# Patient Record
Sex: FEMALE | Race: WHITE | HISPANIC OR LATINO | Employment: FULL TIME | URBAN - METROPOLITAN AREA
[De-identification: names, ages, dates, MRNs, and addresses within clinical notes are randomized per-mention and may not be internally consistent; named-entity substitution may affect disease eponyms.]

---
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
## 2022-01-17 ENCOUNTER — OFFICE VISIT (OUTPATIENT)
Dept: URGENT CARE | Facility: CLINIC | Age: 22
End: 2022-01-17
Payer: COMMERCIAL

## 2022-01-17 VITALS
BODY MASS INDEX: 18.19 KG/M2 | HEART RATE: 71 BPM | TEMPERATURE: 98 F | RESPIRATION RATE: 18 BRPM | OXYGEN SATURATION: 98 % | SYSTOLIC BLOOD PRESSURE: 104 MMHG | HEIGHT: 68 IN | DIASTOLIC BLOOD PRESSURE: 69 MMHG | WEIGHT: 120 LBS

## 2022-01-17 DIAGNOSIS — S06.0X0A CONCUSSION WITHOUT LOSS OF CONSCIOUSNESS, INITIAL ENCOUNTER: Primary | ICD-10-CM

## 2022-01-17 DIAGNOSIS — S16.1XXA STRAIN OF NECK MUSCLE, INITIAL ENCOUNTER: ICD-10-CM

## 2022-01-17 PROCEDURE — 99203 PR OFFICE/OUTPT VISIT, NEW, LEVL III, 30-44 MIN: ICD-10-PCS | Mod: S$GLB,,, | Performed by: NURSE PRACTITIONER

## 2022-01-17 PROCEDURE — 1160F PR REVIEW ALL MEDS BY PRESCRIBER/CLIN PHARMACIST DOCUMENTED: ICD-10-PCS | Mod: CPTII,S$GLB,, | Performed by: NURSE PRACTITIONER

## 2022-01-17 PROCEDURE — 1159F PR MEDICATION LIST DOCUMENTED IN MEDICAL RECORD: ICD-10-PCS | Mod: CPTII,S$GLB,, | Performed by: NURSE PRACTITIONER

## 2022-01-17 PROCEDURE — 3008F BODY MASS INDEX DOCD: CPT | Mod: CPTII,S$GLB,, | Performed by: NURSE PRACTITIONER

## 2022-01-17 PROCEDURE — 3008F PR BODY MASS INDEX (BMI) DOCUMENTED: ICD-10-PCS | Mod: CPTII,S$GLB,, | Performed by: NURSE PRACTITIONER

## 2022-01-17 PROCEDURE — 1159F MED LIST DOCD IN RCRD: CPT | Mod: CPTII,S$GLB,, | Performed by: NURSE PRACTITIONER

## 2022-01-17 PROCEDURE — 99203 OFFICE O/P NEW LOW 30 MIN: CPT | Mod: S$GLB,,, | Performed by: NURSE PRACTITIONER

## 2022-01-17 PROCEDURE — 3074F SYST BP LT 130 MM HG: CPT | Mod: CPTII,S$GLB,, | Performed by: NURSE PRACTITIONER

## 2022-01-17 PROCEDURE — 1160F RVW MEDS BY RX/DR IN RCRD: CPT | Mod: CPTII,S$GLB,, | Performed by: NURSE PRACTITIONER

## 2022-01-17 PROCEDURE — 3078F DIAST BP <80 MM HG: CPT | Mod: CPTII,S$GLB,, | Performed by: NURSE PRACTITIONER

## 2022-01-17 PROCEDURE — 3078F PR MOST RECENT DIASTOLIC BLOOD PRESSURE < 80 MM HG: ICD-10-PCS | Mod: CPTII,S$GLB,, | Performed by: NURSE PRACTITIONER

## 2022-01-17 PROCEDURE — 3074F PR MOST RECENT SYSTOLIC BLOOD PRESSURE < 130 MM HG: ICD-10-PCS | Mod: CPTII,S$GLB,, | Performed by: NURSE PRACTITIONER

## 2022-01-17 NOTE — PATIENT INSTRUCTIONS
Patient Education       Cervical Muscle Strain Discharge Instructions   About this topic   A cervical muscle strain happens when the muscles in your neck are is stretched too much. This is the medical name for a whiplash injury that happens when your head is suddenly jerked forward and backward. A cervical strain most often happens from a car crash or sports injury.  Your neck has many parts including bones, muscles, tendons, ligaments, and nerves. Vertebrae, the bones in your spine, start at the base of your skull and extend down the back of your neck. There are discs between the vertebrae to cushion the bones. Ligaments, muscles, and tendons help hold your spine in place and let you move your neck. Your spinal cord, the major nerve of your body, starts at the base of your brain and extends down your back. It is protected by your vertebrae. Smaller nerves travel from your spinal cord to your muscles and skin. Most neck pain is caused by an injury to a ligament, tendon, muscle, or nerve.     What care is needed at home?   · Ask your doctor what you need to do when you go home. Make sure you ask questions if you do not understand what the doctor says. This way you will know what you need to do.  · Wear your neck brace or cushion as you were told to. If the doctor told you to, you may start doing gentle neck stretches in a few days.  · For recent sprains, place an ice pack or a bag of frozen vegetables wrapped in a towel over the painful part. Never put ice right on the skin. Use ice every 1 to 2 hours for 10 to 15 minutes at a time. Use for the first 24 to 48 hours after your injury.  · Use heat after the first 24 to 48 hours, but not right away. Put a heating pad on the painful part for no more than 20 minutes at a time. Never go to sleep with a heating pad on as this can cause burns. You can also take a hot shower or bath.  · You may want to take medicines like ibuprofen or naproxen for swelling and pain. These are  nonsteroidal anti-inflammatory drugs (NSAIDs).  · Try to practice good posture to avoid putting strain on your neck. Sit up straight and keep your shoulders back. It can also help to avoid sitting in the same position for too long and to avoid putting pressure on your upper back by carrying heavy things. When you sleep, try to keep your neck in line with the rest of your body.  What follow-up care is needed?   Your doctor may ask you to make visits to the office to check on your progress. Be sure to keep these visits. Your doctor may send you to physical therapy or a chiropractor to help you heal faster.  What drugs may be needed?   The doctor may order drugs to:  · Help with pain and swelling  · Relax muscles  What can be done to prevent this health problem?   · Always wear a seat belt. Drive safely. Obey speed limits. Do not drink and drive. Do not text and drive.  · Have headrests in the car at the right height. The middle of the headrest should be even with the upper parts of your ears.  · Use good posture. Do not slouch.  · Take breaks often when doing things that use repeat movements.  · Warm up slowly and stretch before you work out. Use good ways to train, such as slowly adding to how far you run. Do not work out if you are overly tired. Take extra care if working out in cold weather.  · Keep a healthy weight so there is not extra stress on your joints. Eat a healthy diet to keep your muscles healthy.  · Stay active and work out to keep your muscles strong and flexible.  · Use the proper pillow.  When do I need to call the doctor?   · You have trouble breathing.  · You are too weak to stand or cannot move one or both of your arms or legs.  · You have new weakness in one or both of your arms or legs.  · You have numbness, tingling, or shooting pain in one or both of your arms or legs.  · You have bad pain that is not helped by pain medicine.  · Your symptoms are not getting better after treating them at home  for a few weeks.  · Your symptoms are getting worse and you cannot do normal activities like dress yourself or eat.  Teach Back: Helping You Understand   The Teach Back Method helps you understand the information we are giving you. After you talk with the staff, tell them in your own words what you learned. This helps to make sure the staff has described each thing clearly. It also helps to explain things that may have been confusing. Before going home, make sure you can do these:  · I can tell you about my condition.  · I can tell you what may help ease my pain.  · I can tell you what I will do if I have more pain or swelling.  Where can I learn more?   [employer]  http://orthoinfo.aaos.org/topic.cfm?boxjh=L46624   [employer] [facility]  https://www.betterhealth.[place].gov.[place]/health/ConditionsAndTreatments/neck-pain   Last Reviewed Date   2021-06-10  Consumer Information Use and Disclaimer   This information is not specific medical advice and does not replace information you receive from your health care provider. This is only a brief summary of general information. It does NOT include all information about conditions, illnesses, injuries, tests, procedures, treatments, therapies, discharge instructions or life-style choices that may apply to you. You must talk with your health care provider for complete information about your health and treatment options. This information should not be used to decide whether or not to accept your health care providers advice, instructions or recommendations. Only your health care provider has the knowledge and training to provide advice that is right for you.  Copyright   Copyright © 2021 UpToDate, Inc. and its affiliates and/or licensors. All rights reserved.  Patient Education       Concussion in Adults   The Basics   Written by the doctors and editors at [employer]   What is a concussion? -- A concussion is a mild brain injury that can cause confusion,  "memory loss, and headache. Sometimes people pass out (lose consciousness) when they have a concussion, but not always.  A concussion can happen after a person has an injury to the head from getting hit, falling, or being in an accident.  What are the symptoms of a concussion? -- Symptoms that can happen minutes to hours after a concussion include:  · Memory loss - People sometimes forget what caused their injury, as well as what happened right before and after the injury.  · Confusion  · Headache  · Dizziness or trouble with balance  · Nausea or vomiting  · Feeling very tired  · Acting cranky, irritable, or not like themselves  Symptoms that can happen hours to days after a concussion include:  · Trouble walking or talking  · Memory problems or problems paying attention  · Trouble sleeping  · Mood or behavior changes  · Vision changes  · Being bothered by noise or light  Will I need tests? -- It depends on your injury and symptoms. To check if you have a concussion, your doctor will ask about your symptoms and do a physical exam. He or she will also ask you questions to check that you are thinking clearly.  If your doctor suspects a serious injury, he or she might order an imaging test of the brain, such as a CT or MRI scan. These tests create pictures of the skull and inside of the brain.  How is a concussion treated? -- A concussion does not usually need treatment. Most concussions get better on their own, but it can take time. Some people's symptoms go away within minutes to hours. Other people have symptoms for weeks to months. When symptoms last a long time, doctors call it "postconcussion syndrome."  To help your recovery after a concussion, you can:  · Rest your body - Make sure to get plenty of sleep. Avoid heavy exercise or too much physical activity if it makes you feel worse.  · Rest your brain - Avoid doing activities that need concentration or a lot of attention if they make you feel worse. You can " start doing these things again as you get better.  · Not drink alcohol while you are still having symptoms of concussion  · Take a pain-relieving medicine, if you have a headache - You can choose one with acetaminophen (sample brand name: Tylenol) or ibuprofen (sample brand names: Advil, Motrin).  When should I call the doctor or nurse? -- If you had a concussion, or think you might have had one, call your doctor or nurse. They can tell you whether you should go to the emergency department, and how quickly you should be seen.  After a concussion, your doctor might recommend that someone stay with you for 12 to 24 hours. This person should watch for any new symptoms.  You, or the person with you, should call the doctor or nurse if:  · You vomit more than 3 times  · You have a severe headache, or a headache that gets worse  · You have a seizure  · You have trouble walking or talking  · Your vision changes  · You feel weak or numb in part of your body  · You lose control over your bladder or bowels  The person with you should also call right away if they have any trouble waking you up.  When can I play sports or do my usual activities again? -- Ask your doctor when you can play sports or do your usual activities again. It will depend on your injury and symptoms, as well as the type of sport you play. Do not go back to playing on the same day as your injury.  It's important to let your brain heal completely after a concussion. Getting another concussion before your brain has healed may lead to serious brain problems.   How can I prevent another concussion? -- To help prevent another concussion, you can:  · Wear a helmet when you ride a bike or motorcycle, or play certain sports  · Wear a seat belt when you drive or ride in a car  If you have one concussion, it's very important to try to prevent future concussions. Having many concussions might cause long-term brain damage and affect your thinking.  All topics are  updated as new evidence becomes available and our peer review process is complete.  This topic retrieved from [employer] on: Sep 21, 2021.  Topic 14259 Version 7.0  Release: 29.4.2 - C29.263  © 2021 UpToDate, Inc. and/or its affiliates. All rights reserved.  Consumer Information Use and Disclaimer   This information is not specific medical advice and does not replace information you receive from your health care provider. This is only a brief summary of general information. It does NOT include all information about conditions, illnesses, injuries, tests, procedures, treatments, therapies, discharge instructions or life-style choices that may apply to you. You must talk with your health care provider for complete information about your health and treatment options. This information should not be used to decide whether or not to accept your health care provider's advice, instructions or recommendations. Only your health care provider has the knowledge and training to provide advice that is right for you. The use of this information is governed by the [employer] End User License Agreement, available at [email or web address].The use of [employer] content is governed by the [employer] Terms of Use. ©2021 UpToDate, Inc. All rights reserved.  Copyright   © 2021 UpToDate, Inc. and/or its affiliates. All rights reserved.

## 2022-01-17 NOTE — PROGRESS NOTES
"Subjective:       Patient ID: Vicki Wise is a 21 y.o. female.    Vitals:  height is 5' 8" (1.727 m) and weight is 54.4 kg (120 lb). Her oral temperature is 97.8 °F (36.6 °C). Her blood pressure is 104/69 and her pulse is 71. Her respiration is 18 and oxygen saturation is 98%.     Chief Complaint: Head Injury (Neck injury)    Pt stated that she fell off a skate board on yesterday and she hit her posterior head to the concrete. This occurred 330 pm yesterday.  Denies LOC.  C/o neck soreness and headaches, some nausea without vomiting and feeling a bit "whoozy". Pain is 6/10, intermittent    Head Injury   The incident occurred 12 to 24 hours ago. The injury mechanism was a fall. There was no loss of consciousness. The quality of the pain is described as aching. The pain is at a severity of 8/10. The pain is moderate. The pain has been constant since the injury. Associated symptoms include headaches. Pertinent negatives include no blurred vision, disorientation, memory loss, numbness, vomiting or weakness. Treatments tried: Ibuprofen. The treatment provided no relief.       Constitution: Negative for chills, sweating, fatigue, fever, unexpected weight change and generalized weakness.   Neck: Positive for neck stiffness. Negative for neck pain and neck swelling.   Cardiovascular: Negative for chest pain, leg swelling, palpitations and sob on exertion.   Eyes: Negative for eye pain, photophobia, vision loss, double vision, blurred vision and eyelid swelling.   Respiratory: Negative for chest tightness and shortness of breath.    Gastrointestinal: Positive for nausea. Negative for abdominal trauma, abdominal pain and vomiting.   Musculoskeletal: Positive for trauma and muscle ache. Negative for abnormal ROM of joint, back pain and muscle cramps.   Skin: Negative for color change, pale, rash, abrasion and erythema.   Neurological: Positive for headaches. Negative for dizziness, light-headedness, passing out, facial " drooping, speech difficulty, coordination disturbances, loss of balance, disorientation, altered mental status, loss of consciousness, numbness, tingling, seizures and tremors.   Psychiatric/Behavioral: Negative for altered mental status and disorientation.       Objective:      Physical Exam   Constitutional:  Non-toxic appearance. She does not appear ill. No distress.   HENT:   Head: Normocephalic.   Eyes: Conjunctivae are normal. Pupils are equal, round, and reactive to light. No visual field deficit is present. Right eye exhibits no discharge. Left eye exhibits no discharge. No scleral icterus.      extraocular movement intact   Neck: Neck supple. No neck rigidity present.   Pulmonary/Chest: Effort normal. No respiratory distress.   Musculoskeletal: Normal range of motion.         General: Signs of injury present. No swelling, tenderness or deformity. Normal range of motion.      Cervical back: She exhibits no tenderness.      Comments: Full cervical spine ROM, no pain with movement  No point tenderness to cervical spine    Neurological: no focal deficit. She is alert. She has normal motor skills. She displays no weakness, facial symmetry and normal reflexes. Gait and coordination normal. Coordination and gait normal.   Reflex Scores:       Bicep reflexes are 2+ on the right side and 2+ on the left side.  Skin: Skin is warm, not diaphoretic, not pale and no rash. Capillary refill takes 2 to 3 seconds. No bruising and No erythema   Psychiatric: Mood and thought content normal.   Vitals reviewed.        Assessment:       1. Concussion without loss of consciousness, initial encounter    2. Strain of neck muscle, initial encounter          Plan:         Concussion without loss of consciousness, initial encounter    Strain of neck muscle, initial encounter                 Patient Instructions   Patient Education       Cervical Muscle Strain Discharge Instructions   About this topic   A cervical muscle strain happens  when the muscles in your neck are is stretched too much. This is the medical name for a whiplash injury that happens when your head is suddenly jerked forward and backward. A cervical strain most often happens from a car crash or sports injury.  Your neck has many parts including bones, muscles, tendons, ligaments, and nerves. Vertebrae, the bones in your spine, start at the base of your skull and extend down the back of your neck. There are discs between the vertebrae to cushion the bones. Ligaments, muscles, and tendons help hold your spine in place and let you move your neck. Your spinal cord, the major nerve of your body, starts at the base of your brain and extends down your back. It is protected by your vertebrae. Smaller nerves travel from your spinal cord to your muscles and skin. Most neck pain is caused by an injury to a ligament, tendon, muscle, or nerve.     What care is needed at home?   · Ask your doctor what you need to do when you go home. Make sure you ask questions if you do not understand what the doctor says. This way you will know what you need to do.  · Wear your neck brace or cushion as you were told to. If the doctor told you to, you may start doing gentle neck stretches in a few days.  · For recent sprains, place an ice pack or a bag of frozen vegetables wrapped in a towel over the painful part. Never put ice right on the skin. Use ice every 1 to 2 hours for 10 to 15 minutes at a time. Use for the first 24 to 48 hours after your injury.  · Use heat after the first 24 to 48 hours, but not right away. Put a heating pad on the painful part for no more than 20 minutes at a time. Never go to sleep with a heating pad on as this can cause burns. You can also take a hot shower or bath.  · You may want to take medicines like ibuprofen or naproxen for swelling and pain. These are nonsteroidal anti-inflammatory drugs (NSAIDs).  · Try to practice good posture to avoid putting strain on your neck. Sit up  straight and keep your shoulders back. It can also help to avoid sitting in the same position for too long and to avoid putting pressure on your upper back by carrying heavy things. When you sleep, try to keep your neck in line with the rest of your body.  What follow-up care is needed?   Your doctor may ask you to make visits to the office to check on your progress. Be sure to keep these visits. Your doctor may send you to physical therapy or a chiropractor to help you heal faster.  What drugs may be needed?   The doctor may order drugs to:  · Help with pain and swelling  · Relax muscles  What can be done to prevent this health problem?   · Always wear a seat belt. Drive safely. Obey speed limits. Do not drink and drive. Do not text and drive.  · Have headrests in the car at the right height. The middle of the headrest should be even with the upper parts of your ears.  · Use good posture. Do not slouch.  · Take breaks often when doing things that use repeat movements.  · Warm up slowly and stretch before you work out. Use good ways to train, such as slowly adding to how far you run. Do not work out if you are overly tired. Take extra care if working out in cold weather.  · Keep a healthy weight so there is not extra stress on your joints. Eat a healthy diet to keep your muscles healthy.  · Stay active and work out to keep your muscles strong and flexible.  · Use the proper pillow.  When do I need to call the doctor?   · You have trouble breathing.  · You are too weak to stand or cannot move one or both of your arms or legs.  · You have new weakness in one or both of your arms or legs.  · You have numbness, tingling, or shooting pain in one or both of your arms or legs.  · You have bad pain that is not helped by pain medicine.  · Your symptoms are not getting better after treating them at home for a few weeks.  · Your symptoms are getting worse and you cannot do normal activities like dress yourself or eat.  Teach  Back: Helping You Understand   The Teach Back Method helps you understand the information we are giving you. After you talk with the staff, tell them in your own words what you learned. This helps to make sure the staff has described each thing clearly. It also helps to explain things that may have been confusing. Before going home, make sure you can do these:  · I can tell you about my condition.  · I can tell you what may help ease my pain.  · I can tell you what I will do if I have more pain or swelling.  Where can I learn more?   American Academy of Orthopedic Surgeons  http://orthoinfo.aaos.org/topic.cfm?gzoqn=C17436   Better [employer]  https://www.betterhealth.[place].gov.au/health/ConditionsAndTreatments/neck-pain   Last Reviewed Date   2021-06-10  Consumer Information Use and Disclaimer   This information is not specific medical advice and does not replace information you receive from your health care provider. This is only a brief summary of general information. It does NOT include all information about conditions, illnesses, injuries, tests, procedures, treatments, therapies, discharge instructions or life-style choices that may apply to you. You must talk with your health care provider for complete information about your health and treatment options. This information should not be used to decide whether or not to accept your health care providers advice, instructions or recommendations. Only your health care provider has the knowledge and training to provide advice that is right for you.  Copyright   Copyright © 2021 UpToDate, Inc. and its affiliates and/or licensors. All rights reserved.  Patient Education       Concussion in Adults   The Basics   Written by the doctors and editors at [facility]   What is a concussion? -- A concussion is a mild brain injury that can cause confusion, memory loss, and headache. Sometimes people pass out (lose consciousness) when they have a concussion, but not always.  A  "concussion can happen after a person has an injury to the head from getting hit, falling, or being in an accident.  What are the symptoms of a concussion? -- Symptoms that can happen minutes to hours after a concussion include:  · Memory loss - People sometimes forget what caused their injury, as well as what happened right before and after the injury.  · Confusion  · Headache  · Dizziness or trouble with balance  · Nausea or vomiting  · Feeling very tired  · Acting cranky, irritable, or not like themselves  Symptoms that can happen hours to days after a concussion include:  · Trouble walking or talking  · Memory problems or problems paying attention  · Trouble sleeping  · Mood or behavior changes  · Vision changes  · Being bothered by noise or light  Will I need tests? -- It depends on your injury and symptoms. To check if you have a concussion, your doctor will ask about your symptoms and do a physical exam. He or she will also ask you questions to check that you are thinking clearly.  If your doctor suspects a serious injury, he or she might order an imaging test of the brain, such as a CT or MRI scan. These tests create pictures of the skull and inside of the brain.  How is a concussion treated? -- A concussion does not usually need treatment. Most concussions get better on their own, but it can take time. Some people's symptoms go away within minutes to hours. Other people have symptoms for weeks to months. When symptoms last a long time, doctors call it "postconcussion syndrome."  To help your recovery after a concussion, you can:  · Rest your body - Make sure to get plenty of sleep. Avoid heavy exercise or too much physical activity if it makes you feel worse.  · Rest your brain - Avoid doing activities that need concentration or a lot of attention if they make you feel worse. You can start doing these things again as you get better.  · Not drink alcohol while you are still having symptoms of " concussion  · Take a pain-relieving medicine, if you have a headache - You can choose one with acetaminophen (sample brand name: Tylenol) or ibuprofen (sample brand names: Advil, Motrin).  When should I call the doctor or nurse? -- If you had a concussion, or think you might have had one, call your doctor or nurse. They can tell you whether you should go to the emergency department, and how quickly you should be seen.  After a concussion, your doctor might recommend that someone stay with you for 12 to 24 hours. This person should watch for any new symptoms.  You, or the person with you, should call the doctor or nurse if:  · You vomit more than 3 times  · You have a severe headache, or a headache that gets worse  · You have a seizure  · You have trouble walking or talking  · Your vision changes  · You feel weak or numb in part of your body  · You lose control over your bladder or bowels  The person with you should also call right away if they have any trouble waking you up.  When can I play sports or do my usual activities again? -- Ask your doctor when you can play sports or do your usual activities again. It will depend on your injury and symptoms, as well as the type of sport you play. Do not go back to playing on the same day as your injury.  It's important to let your brain heal completely after a concussion. Getting another concussion before your brain has healed may lead to serious brain problems.   How can I prevent another concussion? -- To help prevent another concussion, you can:  · Wear a helmet when you ride a bike or motorcycle, or play certain sports  · Wear a seat belt when you drive or ride in a car  If you have one concussion, it's very important to try to prevent future concussions. Having many concussions might cause long-term brain damage and affect your thinking.  All topics are updated as new evidence becomes available and our peer review process is complete.  This topic retrieved from  [employer] on: Sep 21, 2021.  Topic 25546 Version 7.0  Release: 29.4.2 - C29.263  © 2021 UpToDate, Inc. and/or its affiliates. All rights reserved.  Consumer Information Use and Disclaimer   This information is not specific medical advice and does not replace information you receive from your health care provider. This is only a brief summary of general information. It does NOT include all information about conditions, illnesses, injuries, tests, procedures, treatments, therapies, discharge instructions or life-style choices that may apply to you. You must talk with your health care provider for complete information about your health and treatment options. This information should not be used to decide whether or not to accept your health care provider's advice, instructions or recommendations. Only your health care provider has the knowledge and training to provide advice that is right for you. The use of this information is governed by the [employer] End User License Agreement, available at https://www.[employer]/en/solutions/[employer]/about/louis.The use of [employer] content is governed by the [employer] Terms of Use. ©2021 UpToDate, Inc. All rights reserved.  Copyright   © 2021 UpToDate, Inc. and/or its affiliates. All rights reserved.      Advised on return/follow-up precautions. Advised on ER precautions. Answered all patient questions. Patient verbalized understanding and voiced agreement with current treatment plan.

## 2022-01-18 ENCOUNTER — LAB VISIT (OUTPATIENT)
Dept: PRIMARY CARE CLINIC | Facility: CLINIC | Age: 22
End: 2022-01-18
Payer: COMMERCIAL

## 2022-01-18 ENCOUNTER — PATIENT MESSAGE (OUTPATIENT)
Dept: ADMINISTRATIVE | Facility: OTHER | Age: 22
End: 2022-01-18
Payer: COMMERCIAL

## 2022-01-18 DIAGNOSIS — Z20.822 CONTACT WITH AND (SUSPECTED) EXPOSURE TO COVID-19: ICD-10-CM

## 2022-01-18 LAB
CTP QC/QA: YES
SARS-COV-2 AG RESP QL IA.RAPID: NEGATIVE

## 2022-01-18 PROCEDURE — 87811 SARS-COV-2 COVID19 W/OPTIC: CPT

## 2023-02-24 ENCOUNTER — HOSPITAL ENCOUNTER (EMERGENCY)
Facility: OTHER | Age: 23
Discharge: HOME | End: 2023-02-25
Attending: EMERGENCY MEDICINE
Payer: COMMERCIAL

## 2023-02-24 DIAGNOSIS — N93.8 DUB (DYSFUNCTIONAL UTERINE BLEEDING): Primary | ICD-10-CM

## 2023-02-24 DIAGNOSIS — R42 DIZZINESS: ICD-10-CM

## 2023-02-24 LAB
ANION GAP SERPL CALC-SCNC: 9 MMOL/L (ref 8–16)
B-HCG UR QL: NEGATIVE
BASOPHILS # BLD AUTO: 0.03 K/UL (ref 0–0.2)
BASOPHILS NFR BLD: 0.4 % (ref 0–1.9)
BUN SERPL-MCNC: 10 MG/DL (ref 6–20)
CALCIUM SERPL-MCNC: 9.4 MG/DL (ref 8.7–10.5)
CHLORIDE SERPL-SCNC: 105 MMOL/L (ref 95–110)
CO2 SERPL-SCNC: 23 MMOL/L (ref 23–29)
CREAT SERPL-MCNC: 0.7 MG/DL (ref 0.5–1.4)
CTP QC/QA: YES
DIFFERENTIAL METHOD: ABNORMAL
EOSINOPHIL # BLD AUTO: 0.2 K/UL (ref 0–0.5)
EOSINOPHIL NFR BLD: 2.9 % (ref 0–8)
ERYTHROCYTE [DISTWIDTH] IN BLOOD BY AUTOMATED COUNT: 12.1 % (ref 11.5–14.5)
EST. GFR  (NO RACE VARIABLE): >60 ML/MIN/1.73 M^2
GLUCOSE SERPL-MCNC: 100 MG/DL (ref 70–110)
HCT VFR BLD AUTO: 36.1 % (ref 37–48.5)
HCV AB SERPL QL IA: NEGATIVE
HGB BLD-MCNC: 12.1 G/DL (ref 12–16)
HIV 1+2 AB+HIV1 P24 AG SERPL QL IA: NEGATIVE
IMM GRANULOCYTES # BLD AUTO: 0.02 K/UL (ref 0–0.04)
IMM GRANULOCYTES NFR BLD AUTO: 0.3 % (ref 0–0.5)
LYMPHOCYTES # BLD AUTO: 2.5 K/UL (ref 1–4.8)
LYMPHOCYTES NFR BLD: 32.9 % (ref 18–48)
MCH RBC QN AUTO: 30.8 PG (ref 27–31)
MCHC RBC AUTO-ENTMCNC: 33.5 G/DL (ref 32–36)
MCV RBC AUTO: 92 FL (ref 82–98)
MONOCYTES # BLD AUTO: 0.7 K/UL (ref 0.3–1)
MONOCYTES NFR BLD: 8.6 % (ref 4–15)
NEUTROPHILS # BLD AUTO: 4.2 K/UL (ref 1.8–7.7)
NEUTROPHILS NFR BLD: 54.9 % (ref 38–73)
NRBC BLD-RTO: 0 /100 WBC
PLATELET # BLD AUTO: 211 K/UL (ref 150–450)
PMV BLD AUTO: 10.8 FL (ref 9.2–12.9)
POTASSIUM SERPL-SCNC: 3.5 MMOL/L (ref 3.5–5.1)
RBC # BLD AUTO: 3.93 M/UL (ref 4–5.4)
SODIUM SERPL-SCNC: 137 MMOL/L (ref 136–145)
WBC # BLD AUTO: 7.68 K/UL (ref 3.9–12.7)

## 2023-02-24 PROCEDURE — 87389 HIV-1 AG W/HIV-1&-2 AB AG IA: CPT | Performed by: EMERGENCY MEDICINE

## 2023-02-24 PROCEDURE — 81025 URINE PREGNANCY TEST: CPT | Performed by: EMERGENCY MEDICINE

## 2023-02-24 PROCEDURE — 93010 ELECTROCARDIOGRAM REPORT: CPT | Mod: ,,, | Performed by: INTERNAL MEDICINE

## 2023-02-24 PROCEDURE — 96374 THER/PROPH/DIAG INJ IV PUSH: CPT

## 2023-02-24 PROCEDURE — 25000003 PHARM REV CODE 250: Performed by: EMERGENCY MEDICINE

## 2023-02-24 PROCEDURE — 93010 EKG 12-LEAD: ICD-10-PCS | Mod: ,,, | Performed by: INTERNAL MEDICINE

## 2023-02-24 PROCEDURE — 96376 TX/PRO/DX INJ SAME DRUG ADON: CPT

## 2023-02-24 PROCEDURE — 93005 ELECTROCARDIOGRAM TRACING: CPT

## 2023-02-24 PROCEDURE — 96375 TX/PRO/DX INJ NEW DRUG ADDON: CPT

## 2023-02-24 PROCEDURE — 80048 BASIC METABOLIC PNL TOTAL CA: CPT | Performed by: EMERGENCY MEDICINE

## 2023-02-24 PROCEDURE — 99285 EMERGENCY DEPT VISIT HI MDM: CPT | Mod: 25

## 2023-02-24 PROCEDURE — 86803 HEPATITIS C AB TEST: CPT | Performed by: EMERGENCY MEDICINE

## 2023-02-24 PROCEDURE — 85025 COMPLETE CBC W/AUTO DIFF WBC: CPT | Performed by: EMERGENCY MEDICINE

## 2023-02-24 PROCEDURE — 96361 HYDRATE IV INFUSION ADD-ON: CPT

## 2023-02-24 PROCEDURE — 63600175 PHARM REV CODE 636 W HCPCS: Performed by: EMERGENCY MEDICINE

## 2023-02-24 RX ORDER — MORPHINE SULFATE 2 MG/ML
2 INJECTION, SOLUTION INTRAMUSCULAR; INTRAVENOUS
Status: COMPLETED | OUTPATIENT
Start: 2023-02-24 | End: 2023-02-24

## 2023-02-24 RX ORDER — ESCITALOPRAM OXALATE 10 MG/1
10 TABLET ORAL DAILY
COMMUNITY

## 2023-02-24 RX ORDER — ONDANSETRON 2 MG/ML
8 INJECTION INTRAMUSCULAR; INTRAVENOUS ONCE
Status: COMPLETED | OUTPATIENT
Start: 2023-02-24 | End: 2023-02-24

## 2023-02-24 RX ORDER — MORPHINE SULFATE 4 MG/ML
4 INJECTION, SOLUTION INTRAMUSCULAR; INTRAVENOUS
Status: COMPLETED | OUTPATIENT
Start: 2023-02-24 | End: 2023-02-24

## 2023-02-24 RX ORDER — ACETAMINOPHEN 500 MG
1000 TABLET ORAL
Status: COMPLETED | OUTPATIENT
Start: 2023-02-24 | End: 2023-02-24

## 2023-02-24 RX ADMIN — SODIUM CHLORIDE 1000 ML: 9 INJECTION, SOLUTION INTRAVENOUS at 08:02

## 2023-02-24 RX ADMIN — MORPHINE SULFATE 2 MG: 2 INJECTION, SOLUTION INTRAMUSCULAR; INTRAVENOUS at 09:02

## 2023-02-24 RX ADMIN — ACETAMINOPHEN 1000 MG: 500 TABLET, FILM COATED ORAL at 11:02

## 2023-02-24 RX ADMIN — MORPHINE SULFATE 4 MG: 4 INJECTION, SOLUTION INTRAMUSCULAR; INTRAVENOUS at 10:02

## 2023-02-24 RX ADMIN — ONDANSETRON 8 MG: 2 INJECTION INTRAMUSCULAR; INTRAVENOUS at 09:02

## 2023-02-24 NOTE — Clinical Note
"Vicki Mcdonough" Chris Wise was seen and treated in our emergency department on 2/24/2023.  She may return to work on 02/15/2023.  May return sooner if feeling better.     If you have any questions or concerns, please don't hesitate to call.      saadia thomas RN    "

## 2023-02-25 VITALS
SYSTOLIC BLOOD PRESSURE: 117 MMHG | WEIGHT: 120 LBS | OXYGEN SATURATION: 100 % | HEART RATE: 81 BPM | RESPIRATION RATE: 15 BRPM | TEMPERATURE: 98 F | DIASTOLIC BLOOD PRESSURE: 67 MMHG | BODY MASS INDEX: 18.19 KG/M2 | HEIGHT: 68 IN

## 2023-02-25 RX ORDER — HYDROCODONE BITARTRATE AND ACETAMINOPHEN 5; 325 MG/1; MG/1
1 TABLET ORAL EVERY 6 HOURS PRN
Qty: 8 TABLET | Refills: 0 | Status: SHIPPED | OUTPATIENT
Start: 2023-02-25

## 2023-02-25 NOTE — ED PROVIDER NOTES
Encounter Date: 2/24/2023    SCRIBE #1 NOTE: I, Wesley Xiong, am scribing for, and in the presence of,  Shannan Aj MD.     History     Chief Complaint   Patient presents with    Vaginal Bleeding     Started menses yesterday, heavy bleeding, dark red, passing clots, never been then heavy, has weakness, cold sweats, is pale. Also c/o lower abd pain     Time seen by provider: 9:12 PM    This is a 23 y.o. female who presents with complaint of vaginal bleeding onset 1 day ago. She also complains of nausea and vaginal pain. Patient's mother explains that she is experiencing a severe menstrual period and nearly lost consciousness earlier today. Patient herself states that she has never experienced a menstrual period as severe as this one, and she endorses that she took 300 mg ibuprofen to no relief. This is the extent of the patient's complaints at this time.    The history is provided by the patient and a parent.   Review of patient's allergies indicates:  No Known Allergies  No past medical history on file.  No past surgical history on file.  Family History   Problem Relation Age of Onset    Diabetes Maternal Grandfather     Stroke Maternal Grandfather      Social History     Tobacco Use    Smoking status: Never    Smokeless tobacco: Never   Substance Use Topics    Alcohol use: No     Review of Systems   Constitutional:  Negative for activity change, appetite change, chills, diaphoresis and fever.   HENT:  Negative for congestion, sore throat and trouble swallowing.    Eyes:  Negative for photophobia and visual disturbance.   Respiratory:  Negative for cough, chest tightness and shortness of breath.    Cardiovascular:  Negative for chest pain.   Gastrointestinal:  Positive for nausea. Negative for abdominal pain and vomiting.   Endocrine: Negative for polydipsia and polyuria.   Genitourinary:  Positive for vaginal bleeding and vaginal pain. Negative for difficulty urinating and flank pain.   Musculoskeletal:   Negative for back pain and neck pain.   Skin:  Negative for rash.   Neurological:  Negative for weakness and headaches.   Psychiatric/Behavioral:  Negative for confusion.      Physical Exam     Initial Vitals [02/24/23 2034]   BP Pulse Resp Temp SpO2   114/68 61 16 97.8 °F (36.6 °C) 100 %      MAP       --         Physical Exam    Constitutional: She appears well-developed. She is cooperative.   Uncomfortable appearing. Nauseated.    HENT:   Head: Atraumatic.   Eyes: Conjunctivae and lids are normal.   Neck:   Normal range of motion.  Cardiovascular:  Normal rate.           Abdominal: There is no abdominal tenderness.   Genitourinary:    Genitourinary Comments: External genitourinary exam performed with chaperone present. Blood present at introitus. No bimanual exam performed. No speculum exam performed.      Musculoskeletal:         General: Normal range of motion.      Cervical back: Normal range of motion.     Neurological: She is alert.   Skin: No rash noted.   Psychiatric: She has a normal mood and affect. Her speech is normal and behavior is normal.       ED Course   Procedures  Labs Reviewed   CBC W/ AUTO DIFFERENTIAL - Abnormal; Notable for the following components:       Result Value    RBC 3.93 (*)     Hematocrit 36.1 (*)     All other components within normal limits    Narrative:     Release to patient->Immediate   HIV 1 / 2 ANTIBODY    Narrative:     Release to patient->Immediate   HEPATITIS C ANTIBODY    Narrative:     Release to patient->Immediate   BASIC METABOLIC PANEL    Narrative:     Release to patient->Immediate   POCT URINE PREGNANCY     EKG Readings: (Independently Interpreted)   Sinus arrhythmia. Rate of 66. Normal Intervals.  No STEMI.     Imaging Results              US Pelvis Complete Non OB (Final result)  Result time 02/25/23 00:54:28   Procedure changed from US Abdomen Limited     Final result by Ena George MD (02/25/23 00:54:28)                   Impression:      Left ovarian 4  cm cyst.  This is technically within normal limits in size for a patient of this age.  If symptomatic, future pelvic ultrasound follow-up may be obtained in 6 weeks to ensure resolution.      Electronically signed by: Ena George MD  Date:    02/25/2023  Time:    00:54               Narrative:    EXAMINATION:  US PELVIS COMPLETE NON OB    CLINICAL HISTORY:  rlq pain, eval for potential ovarian cysts pls (not sexually active- hence no transvag);    TECHNIQUE:  Transabdominal sonography of the pelvis was performed.    COMPARISON:  None.    FINDINGS:  The uterus measures 9 x 5 x 5 cm. Uterine parenchyma is somewhat heterogenous in echotexture without evidence for masses. The endometrial echo complex is normal in thickness and measures 6 mm.    The right ovary measures 4 x 2 x 3 cm. The left ovary measures 5 x 5 x 4 cm. Arterial and venous flow are preserved bilaterally.  Left ovarian 4 cm cyst is seen.  Otherwise no adnexal abnormalities are seen.  No significant free fluid is seen.                                       Medications   sodium chloride 0.9% bolus 1,000 mL 1,000 mL (0 mLs Intravenous Stopped 2/24/23 2158)   ondansetron injection 8 mg (8 mg Intravenous Given 2/24/23 2104)   morphine injection 2 mg (2 mg Intravenous Given 2/24/23 2126)   morphine injection 4 mg (4 mg Intravenous Given 2/24/23 2221)   acetaminophen tablet 1,000 mg (1,000 mg Oral Given 2/24/23 2314)     Medical Decision Making:   History:   Old Medical Records: I decided to obtain old medical records.  Initial Assessment:   Imaging with possible cyst, no overlying tenderness.  Patient feeling much improved at time of discharge, with plan to follow-up with OBGYN.  Independently Interpreted Test(s):   I have ordered and independently interpreted EKG Reading(s) - see prior notes  Clinical Tests:   Lab Tests: Reviewed and Ordered  Radiological Study: Ordered and Reviewed  Medical Tests: Ordered and Reviewed        Scribe Attestation:   Chelsi  #1: I performed the above scribed service and the documentation accurately describes the services I performed. I attest to the accuracy of the note.      Physician Attestation for Scribe: I, Jean Marie, reviewed documentation as scribed in my presence, which is both accurate and complete.  ED Course as of 02/25/23 0102 Fri Feb 24, 2023   2131 Urgent evaluation a 23-year-old female with painful menstrual cycles here with vaginal bleeding, heavy menses and lower pelvic cramping.  Patient tried ibuprofen with limited relief, presents with near syncopal episode.  On examination the patient appears uncomfortable, tearful, no focal abdominal tenderness.  Will plan to evaluate for acute anemia, administer analgesics and reassess. [DM]   Sat Feb 25, 2023   0032 Patient reports feeling nearly improved.  Patient appears much more comfortable.  Discussed plan for discharge with follow-up outpatient gyn. [DM]      ED Course User Index  [DM] Shannan Aj MD                 Clinical Impression:   Final diagnoses:  [R42] Dizziness  [N93.8] DUB (dysfunctional uterine bleeding) (Primary)        ED Disposition Condition    Discharge Stable          ED Prescriptions       Medication Sig Dispense Start Date End Date Auth. Provider    HYDROcodone-acetaminophen (NORCO) 5-325 mg per tablet Take 1 tablet by mouth every 6 (six) hours as needed for Pain. 8 tablet 2/25/2023 -- Shannan Aj MD          Follow-up Information       Follow up With Specialties Details Why Contact Info    Edgar Cavazos MD Obstetrics and Gynecology Schedule an appointment as soon as possible for a visit   [address]  [address]  [facility] [address] [phone]      [place] - [facility]/ [facility] Obstetrics and Gynecology Schedule an appointment as soon as possible for a visit   [address] [place] [address] [phone]             Shannan Aj MD  02/25/23 0102

## 2023-02-25 NOTE — ED NOTES
Patient identifiers verified and correct for patient  C/C: abdominal pain,increase bleeding during period  APPEARANCE: awake and alert in significant pain. Facial grimace, crying, grasping abdomen  SKIN: pale and clammy No breakdown or bruising.  MUSCULOSKELETAL: Patient moving all extremities spontaneously, no obvious swelling or deformities noted. Ambulates independently. Currently generalized weakness  RESPIRATORY: Denies shortness of breath. Hyperventilation s/t pain  CARDIAC: Denies CP,  distal pulses; no peripheral edema  ABDOMEN: 10/10 pain to pelvis - left and right side. Nausea and vomiting present.  Denies diarrhea.  : voids spontaneously, denies difficulty.  Pt reports severe menstrual pain.  Neurologic: AAO x 4; follows commands equal strength in all extremities; denies numbness/tingling. Denies dizziness

## 2023-06-27 ENCOUNTER — TELEPHONE (OUTPATIENT)
Dept: OPTOMETRY | Facility: CLINIC | Age: 23
End: 2023-06-27
Payer: COMMERCIAL

## 2023-08-23 ENCOUNTER — HOSPITAL ENCOUNTER (EMERGENCY)
Facility: HOSPITAL | Age: 23
Discharge: HOME | End: 2023-08-23
Attending: EMERGENCY MEDICINE
Payer: COMMERCIAL

## 2023-08-23 VITALS
HEART RATE: 78 BPM | OXYGEN SATURATION: 98 % | RESPIRATION RATE: 18 BRPM | TEMPERATURE: 98 F | HEIGHT: 68 IN | BODY MASS INDEX: 18.19 KG/M2 | SYSTOLIC BLOOD PRESSURE: 104 MMHG | DIASTOLIC BLOOD PRESSURE: 72 MMHG | WEIGHT: 120 LBS

## 2023-08-23 DIAGNOSIS — U07.1 COVID-19: ICD-10-CM

## 2023-08-23 DIAGNOSIS — R06.02 SHORTNESS OF BREATH: ICD-10-CM

## 2023-08-23 LAB
B-HCG UR QL: NEGATIVE
CTP QC/QA: YES

## 2023-08-23 PROCEDURE — 93005 ELECTROCARDIOGRAM TRACING: CPT

## 2023-08-23 PROCEDURE — 93010 EKG 12-LEAD: ICD-10-PCS | Mod: ,,, | Performed by: INTERNAL MEDICINE

## 2023-08-23 PROCEDURE — 99284 EMERGENCY DEPT VISIT MOD MDM: CPT | Mod: 25

## 2023-08-23 PROCEDURE — 81025 URINE PREGNANCY TEST: CPT | Performed by: PHYSICIAN ASSISTANT

## 2023-08-23 PROCEDURE — 93010 ELECTROCARDIOGRAM REPORT: CPT | Mod: ,,, | Performed by: INTERNAL MEDICINE

## 2023-08-23 RX ORDER — ALBUTEROL SULFATE 90 UG/1
1-2 AEROSOL, METERED RESPIRATORY (INHALATION) EVERY 6 HOURS PRN
Qty: 8 G | Refills: 0 | Status: SHIPPED | OUTPATIENT
Start: 2023-08-23 | End: 2024-08-22

## 2023-08-23 NOTE — DISCHARGE INSTRUCTIONS

## 2023-08-23 NOTE — ED PROVIDER NOTES
Encounter Date: 8/23/2023       History     Chief Complaint   Patient presents with    Shortness of Breath    COVID-19 Concerns     Home COVID test positive. Increased SOB and midsternal CP with deep breathing. Fever at home 2d ago, took Advil, fever has subsided since. Taking Vit C.     23-year-old female presents to ED with concern of feeling short of breath over past 3-4 days.  Diagnosed with COVID roughly 9 days ago.  She reports her other URI like symptoms have since resolved.  Denies any current chest pain, palpitations, lightheadedness or dizziness, nausea, vomiting, fevers, chills, nasal congestion, sore throat, leg swelling.  Denies significant past medical history.  No recent long distance travel or surgery.  No history of DVT/PE.  No other acute complaints at this time.    The history is provided by the patient.     Review of patient's allergies indicates:  No Known Allergies  No past medical history on file.  Past Surgical History:   Procedure Laterality Date    WISDOM TOOTH EXTRACTION       Family History   Problem Relation Age of Onset    Colon cancer Paternal Grandfather     Alzheimer's disease Paternal Grandfather     Prostate cancer Paternal Grandfather     Heart attack Maternal Grandmother     Diabetes Maternal Grandfather     Stroke Maternal Grandfather      Social History     Tobacco Use    Smoking status: Never    Smokeless tobacco: Never   Substance Use Topics    Alcohol use: Yes    Drug use: Never     Review of Systems   Constitutional:  Negative for appetite change, chills, fatigue and fever.   HENT:  Negative for congestion, ear pain and sore throat.    Respiratory:  Positive for shortness of breath. Negative for cough and wheezing.    Cardiovascular:  Negative for chest pain.   Gastrointestinal:  Negative for abdominal pain, diarrhea, nausea and vomiting.   Musculoskeletal:  Negative for myalgias, neck pain and neck stiffness.   Neurological:  Negative for headaches.       Physical Exam      Initial Vitals [08/23/23 1503]   BP Pulse Resp Temp SpO2   104/72 78 18 98.2 °F (36.8 °C) 98 %      MAP       --         Physical Exam    Nursing note and vitals reviewed.  Constitutional: She appears well-developed and well-nourished. She is cooperative. She does not have a sickly appearance. She does not appear ill. No distress.   HENT:   Head: Normocephalic and atraumatic.   Eyes: EOM are normal.   Neck: Neck supple.   Normal range of motion.  Cardiovascular:  Normal rate, regular rhythm and normal heart sounds.           Pulmonary/Chest: Effort normal and breath sounds normal.   Speaking in full sentences with no labored breathing and no signs of respiratory distress.  Lungs are clear bilaterally.  Ambulatory and maintaining appropriate O2 sat 98% on room air.   Abdominal: Abdomen is soft.   Musculoskeletal:         General: No tenderness.      Cervical back: Normal range of motion and neck supple.     Neurological: She is alert and oriented to person, place, and time. She is not disoriented. GCS eye subscore is 4. GCS verbal subscore is 5. GCS motor subscore is 6.   Skin: Skin is warm and dry.   Psychiatric: She has a normal mood and affect. Her speech is normal and behavior is normal. Thought content normal.         ED Course   Procedures  Labs Reviewed   POCT URINE PREGNANCY          Imaging Results              X-Ray Chest PA And Lateral (Final result)  Result time 08/23/23 16:10:40      Final result by Xochitl Nazario MD (08/23/23 16:10:40)                   Impression:      No significant abnormality seen.      Electronically signed by: Xochitl Nazario MD  Date:    08/23/2023  Time:    16:10               Narrative:    EXAMINATION:  XR CHEST PA AND LATERAL    TECHNIQUE:  PA and lateral views of the chest were performed.    COMPARISON:  None    FINDINGS:  The cardiac silhouette is normal in size, midline.    The pulmonary vascularity is normal.    The pulmonary winnie appear normal  bilaterally.    The lungs are well expanded and clear.    No focal airspace disease.    No pleural effusion, no pneumothorax.    The osseous structures appear within normal limits for age.                                       Medications - No data to display  Medical Decision Making  Patient presents with concern of continued to feel short of breath since she was diagnosed with COVID 9 days ago.  Denying any current chest pain, palpitations, leg swelling.  Afebrile with vitals WNL.  O2 sat 98% on room air.  Patient otherwise very well-appearing and in no distress.  Lungs are clear bilaterally.    DDx:  Including but not limited to COVID, viral, pneumonia, pneumothorax, allergy/irritant, asthma    Amount and/or Complexity of Data Reviewed  Labs:  Decision-making details documented in ED Course.  Radiology:  Decision-making details documented in ED Course.  ECG/medicine tests: ordered.     Details: EKG NSR, rate 78, no acute ST elevation. No stemi    Risk  Prescription drug management.               ED Course as of 08/23/23 1750   Wed Aug 23, 2023   1641 Ambulatory pulse ox maintaining 98-99% RA. No signs of labored breathing or SOB [KS]   1641 X-Ray Chest PA And Lateral  No acute cardiopulmonary findings [KS]   1642 POCT urine pregnancy  Negative [KS]   1750 Lengthy discussion was made with patient and mother.  Patient overall very well-appearing in ED and maintaining appropriate O2 sat with clear x-ray and unremarkable EKG.  Vitals remaining stable.  Will give prescription for inhaler, encouraging to continue Tylenol/ibuprofen as needed with close PCP follow-up.  ED return precautions were discussed at length.  They state their understanding and agree with plan. [KS]      ED Course User Index  [KS] Antoine Jimenez PA-C                    Clinical Impression:   Final diagnoses:  [U07.1] COVID-19  [R06.02] Shortness of breath        ED Disposition Condition    Discharge Stable          ED Prescriptions        Medication Sig Dispense Start Date End Date Auth. Provider    albuterol (PROVENTIL/VENTOLIN HFA) 90 mcg/actuation inhaler Inhale 1-2 puffs into the lungs every 6 (six) hours as needed for Wheezing. Rescue 8 g 8/23/2023 8/22/2024 Antoine Jimenez PA-C          Follow-up Information       Follow up With Specialties Details Why Contact Info    [facility]   [address]  [address]  [facility]  [place] [address]  [phone]               Antoine Jimenez PA-C  08/23/23 0900

## 2024-03-19 ENCOUNTER — OFFICE VISIT (OUTPATIENT)
Dept: OPTOMETRY | Facility: CLINIC | Age: 24
End: 2024-03-19
Payer: COMMERCIAL

## 2024-03-19 DIAGNOSIS — H52.203 MYOPIC ASTIGMATISM OF BOTH EYES: Primary | ICD-10-CM

## 2024-03-19 DIAGNOSIS — Z46.0 FITTING AND ADJUSTMENT OF SPECTACLES AND CONTACT LENSES: Primary | ICD-10-CM

## 2024-03-19 DIAGNOSIS — Z97.3 WEARS CONTACT LENSES: ICD-10-CM

## 2024-03-19 DIAGNOSIS — H52.13 MYOPIC ASTIGMATISM OF BOTH EYES: Primary | ICD-10-CM

## 2024-03-19 PROCEDURE — 92310 CONTACT LENS FITTING OU: CPT | Mod: CSM,S$GLB,, | Performed by: OPTOMETRIST

## 2024-03-19 PROCEDURE — 92014 COMPRE OPH EXAM EST PT 1/>: CPT | Mod: S$GLB,,, | Performed by: OPTOMETRIST

## 2024-03-19 PROCEDURE — 92015 DETERMINE REFRACTIVE STATE: CPT | Mod: S$GLB,,, | Performed by: OPTOMETRIST

## 2024-03-19 PROCEDURE — 99999 PR PBB SHADOW E&M-EST. PATIENT-LVL III: CPT | Mod: PBBFAC,,, | Performed by: OPTOMETRIST

## 2024-03-19 NOTE — PROGRESS NOTES
HPI    23 Y/o female is here for routine eye exam with C/o pt states she notices   a little a blurriness in OS pt glasses Rx are fairly new just got them 6   mos ago pt is here today for an update Rx for contact lens.    Pt denies pain and discomfort   Rare floaters     Eye med: no gtt   Last edited by Cornine Tavarez MA on 3/19/2024  7:54 AM.            Assessment /Plan     For exam results, see Encounter Report.    Myopic astigmatism of both eyes    Wears contact lenses      Pt wearing [employer] AST[employer] CLs, but ran out.  Needs slight power change.  Do not have old brand in stock, so refit w DT1 Astig, with good fit/VA    PLAN:    DISP trial Cls--5 pairs  Continue Daily Wear schedule.  NO SLEEPING IN CONTACT LENSES. Exchange daily  If no problems will call for CLRx, rtc 1 yr

## 2024-03-27 ENCOUNTER — PATIENT MESSAGE (OUTPATIENT)
Dept: OPTOMETRY | Facility: CLINIC | Age: 24
End: 2024-03-27
Payer: COMMERCIAL

## 2024-05-20 ENCOUNTER — OFFICE VISIT (OUTPATIENT)
Dept: OBSTETRICS AND GYNECOLOGY | Facility: CLINIC | Age: 24
End: 2024-05-20
Attending: OBSTETRICS & GYNECOLOGY
Payer: COMMERCIAL

## 2024-05-20 VITALS
SYSTOLIC BLOOD PRESSURE: 108 MMHG | WEIGHT: 124 LBS | HEIGHT: 68 IN | DIASTOLIC BLOOD PRESSURE: 64 MMHG | BODY MASS INDEX: 18.79 KG/M2

## 2024-05-20 DIAGNOSIS — Z01.419 WELL WOMAN EXAM WITH ROUTINE GYNECOLOGICAL EXAM: Primary | ICD-10-CM

## 2024-05-20 DIAGNOSIS — R10.2 PELVIC PAIN IN FEMALE: ICD-10-CM

## 2024-05-20 DIAGNOSIS — N80.9 ENDOMETRIOSIS DETERMINED BY LAPAROSCOPY: ICD-10-CM

## 2024-05-20 DIAGNOSIS — Z30.41 ENCOUNTER FOR SURVEILLANCE OF CONTRACEPTIVE PILLS: ICD-10-CM

## 2024-05-20 DIAGNOSIS — R35.0 URINARY FREQUENCY: ICD-10-CM

## 2024-05-20 DIAGNOSIS — K59.00 CONSTIPATION, UNSPECIFIED CONSTIPATION TYPE: ICD-10-CM

## 2024-05-20 PROCEDURE — 99395 PREV VISIT EST AGE 18-39: CPT | Mod: S$GLB,,, | Performed by: OBSTETRICS & GYNECOLOGY

## 2024-05-20 PROCEDURE — 99999 PR PBB SHADOW E&M-EST. PATIENT-LVL IV: CPT | Mod: PBBFAC,,, | Performed by: OBSTETRICS & GYNECOLOGY

## 2024-05-20 RX ORDER — BUSPIRONE HYDROCHLORIDE 5 MG/1
5 TABLET ORAL
COMMUNITY
Start: 2024-03-12 | End: 2024-09-08

## 2024-05-20 RX ORDER — ONDANSETRON 4 MG/1
4 TABLET, ORALLY DISINTEGRATING ORAL
COMMUNITY
Start: 2024-04-30

## 2024-05-20 RX ORDER — FLUTICASONE PROPIONATE 50 MCG
2 SPRAY, SUSPENSION (ML) NASAL
COMMUNITY
Start: 2024-03-12 | End: 2024-09-08

## 2024-05-20 NOTE — PROGRESS NOTES
"CC: Well woman exam    Vicki Wise is a 24 y.o. female  presents for well woman exam.  LMP: Patient's last menstrual period was 2024 (exact date)..  Currently taking loestrin continuously. History of endometriosis determined by scope, had endometriomas removed at the same time.  Has been on this for over a year.  Has noted bloating the last 2 months. No pelvic pain or pressure. Overall normal bowel movements, though sometimes with alternating constipation/ diarrhea.   Has noted more frequent urination over past year. Getting up 4x/ night to urinate.  Possibly incomplete emptying.   Discussed pelvic floor PT.   Engaged, never sexually active.    Past Medical History:   Diagnosis Date    Endometriosis, unspecified      Past Surgical History:   Procedure Laterality Date    Diagnostic LAP with removal of endometrioma with bx of cyst wall and cauterization of endometrial implants  2023    WISDOM TOOTH EXTRACTION       Social History     Socioeconomic History    Marital status: Single   Tobacco Use    Smoking status: Never    Smokeless tobacco: Never   Substance and Sexual Activity    Alcohol use: Yes    Drug use: Never    Sexual activity: Never     Partners: Male     Birth control/protection: OCP     Family History   Problem Relation Name Age of Onset    No Known Problems Mother      No Known Problems Father      No Known Problems Brother      No Known Problems Brother      Heart attack Maternal Grandmother      Diabetes Maternal Grandfather      Stroke Maternal Grandfather      Colon cancer Paternal Grandfather      Alzheimer's disease Paternal Grandfather      Prostate cancer Paternal Grandfather      Breast cancer Neg Hx      Ovarian cancer Neg Hx       OB History          0    Para   0    Term   0       0    AB   0    Living   0         SAB   0    IAB   0    Ectopic   0    Multiple   0    Live Births   0                 /64   Ht 5' 8" (1.727 m)   Wt 56.2 kg (124 lb)   LMP " 01/01/2024 (Exact Date) Comment: taking ocp  BMI 18.85 kg/m²       ROS:  GENERAL: Denies weight gain or weight loss. Feeling well overall.   SKIN: Denies rash or lesions.   HEAD: Denies head injury or headache.   NODES: Denies enlarged lymph nodes.   CHEST: Denies chest pain or shortness of breath.   CARDIOVASCULAR: Denies palpitations or left sided chest pain.   ABDOMEN: No abdominal pain, constipation, diarrhea, nausea, vomiting or rectal bleeding.   URINARY: No frequency, dysuria, hematuria, or burning on urination.  REPRODUCTIVE: See HPI.   BREASTS: The patient performs breast self-examination and denies pain, lumps, or nipple discharge.   HEMATOLOGIC: No easy bruisability or excessive bleeding.   MUSCULOSKELETAL: Denies joint pain or swelling.   NEUROLOGIC: Denies syncope or weakness.   PSYCHIATRIC: Denies depression, anxiety or mood swings.    PHYSICAL EXAM:  APPEARANCE: Well nourished, well developed, in no acute distress.  AFFECT: WNL, alert and oriented x 3  SKIN: No acne or hirsutism  NECK: Neck symmetric without masses or thyromegaly  NODES: No inguinal, cervical, axillary, or femoral lymph node enlargement  CHEST: Good respiratory effect  ABDOMEN: Soft.  No tenderness or masses.  No hepatosplenomegaly.  No hernias.  BREASTS: Symmetrical, no skin changes or visible lesions.  No palpable masses, nipple discharge bilaterally.  PELVIC: Normal external genitalia without lesions.  Normal hair distribution.  Adequate perineal body, normal urethral meatus.  Vagina moist and well rugated without lesions or discharge.  Cervix pink, without lesions, discharge or tenderness.  No significant cystocele or rectocele.  Bimanual exam shows uterus to be normal size, regular, mobile and nontender.  Adnexal fullness right > left.   EXTREMITIES: No edema.    Well woman exam with routine gynecological exam    Encounter for surveillance of contraceptive pills    Endometriosis determined by laparoscopy  -     Ambulatory  referral/consult to Physical/Occupational Therapy; Future; Expected date: 05/27/2024  -     US Pelvis Comp with Transvag NON-OB (xpd; Future; Expected date: 05/20/2024    Pelvic pain in female  -     Ambulatory referral/consult to Physical/Occupational Therapy; Future; Expected date: 05/27/2024    Urinary frequency  -     Ambulatory referral/consult to Physical/Occupational Therapy; Future; Expected date: 05/27/2024  -     US Pelvis Comp with Transvag NON-OB (xpd; Future; Expected date: 05/20/2024    Constipation, unspecified constipation type  -     Ambulatory referral/consult to Gastroenterology; Future; Expected date: 05/27/2024            Patient was counseled today on A.C.S. Pap guidelines and recommendations for yearly pelvic exams, mammograms and monthly self breast exams; to see her PCP for other health maintenance.     No follow-ups on file.                     PAST MEDICAL/SURGICAL/SOCIAL HISTORY/HISTORY OF PRESENT ILLNESS/REVIEW OF SYSTEMS/PHYSICAL EXAM

## 2024-05-21 ENCOUNTER — TELEPHONE (OUTPATIENT)
Dept: OBSTETRICS AND GYNECOLOGY | Facility: CLINIC | Age: 24
End: 2024-05-21
Payer: COMMERCIAL

## 2024-05-21 DIAGNOSIS — N80.9 ENDOMETRIOSIS DETERMINED BY LAPAROSCOPY: Primary | ICD-10-CM

## 2024-05-22 ENCOUNTER — HOSPITAL ENCOUNTER (OUTPATIENT)
Dept: RADIOLOGY | Facility: HOSPITAL | Age: 24
Discharge: HOME | End: 2024-05-22
Attending: OBSTETRICS & GYNECOLOGY
Payer: COMMERCIAL

## 2024-05-22 ENCOUNTER — TELEPHONE (OUTPATIENT)
Dept: OBSTETRICS AND GYNECOLOGY | Facility: CLINIC | Age: 24
End: 2024-05-22
Payer: COMMERCIAL

## 2024-05-22 DIAGNOSIS — N80.9 ENDOMETRIOSIS DETERMINED BY LAPAROSCOPY: ICD-10-CM

## 2024-05-22 PROCEDURE — 76856 US EXAM PELVIC COMPLETE: CPT | Mod: TC

## 2024-05-22 PROCEDURE — 76856 US EXAM PELVIC COMPLETE: CPT | Mod: 26,,, | Performed by: STUDENT IN AN ORGANIZED HEALTH CARE EDUCATION/TRAINING PROGRAM

## 2025-05-11 ENCOUNTER — PATIENT MESSAGE (OUTPATIENT)
Dept: OPTOMETRY | Facility: CLINIC | Age: 25
End: 2025-05-11
Payer: COMMERCIAL